# Patient Record
Sex: FEMALE | Race: WHITE
[De-identification: names, ages, dates, MRNs, and addresses within clinical notes are randomized per-mention and may not be internally consistent; named-entity substitution may affect disease eponyms.]

---

## 2018-07-09 ENCOUNTER — HOSPITAL ENCOUNTER (OUTPATIENT)
Dept: HOSPITAL 92 - LABBT | Age: 68
Discharge: HOME | End: 2018-07-09
Attending: SURGERY
Payer: MEDICARE

## 2018-07-09 DIAGNOSIS — Z01.818: Primary | ICD-10-CM

## 2018-07-09 DIAGNOSIS — C49.3: ICD-10-CM

## 2018-07-09 LAB
ANION GAP SERPL CALC-SCNC: 18 MMOL/L (ref 10–20)
BUN SERPL-MCNC: 13 MG/DL (ref 9.8–20.1)
CALCIUM SERPL-MCNC: 9.5 MG/DL (ref 7.8–10.44)
CHLORIDE SERPL-SCNC: 105 MMOL/L (ref 98–107)
CO2 SERPL-SCNC: 18 MMOL/L (ref 23–31)
CREAT CL PREDICTED SERPL C-G-VRATE: 0 ML/MIN (ref 70–130)
GLUCOSE SERPL-MCNC: 80 MG/DL (ref 80–115)
HGB BLD-MCNC: 12.6 G/DL (ref 12–16)
MCH RBC QN AUTO: 29.4 PG (ref 27–31)
MCV RBC AUTO: 88.8 FL (ref 78–98)
MDIFF COMPLETE?: YES
PLATELET # BLD AUTO: 262 THOU/UL (ref 130–400)
PLATELET BLD QL SMEAR: (no result)
POTASSIUM SERPL-SCNC: 4.3 MMOL/L (ref 3.5–5.1)
RBC # BLD AUTO: 4.28 MILL/UL (ref 4.2–5.4)
SODIUM SERPL-SCNC: 136 MMOL/L (ref 136–145)
WBC # BLD AUTO: 4.9 THOU/UL (ref 4.8–10.8)

## 2018-07-09 PROCEDURE — 93005 ELECTROCARDIOGRAM TRACING: CPT

## 2018-07-09 PROCEDURE — 85025 COMPLETE CBC W/AUTO DIFF WBC: CPT

## 2018-07-09 PROCEDURE — 93010 ELECTROCARDIOGRAM REPORT: CPT

## 2018-07-09 PROCEDURE — 80048 BASIC METABOLIC PNL TOTAL CA: CPT

## 2018-07-10 NOTE — EKG
Test Reason : 

Blood Pressure : ***/*** mmHG

Vent. Rate : 064 BPM     Atrial Rate : 064 BPM

   P-R Int : 188 ms          QRS Dur : 074 ms

    QT Int : 408 ms       P-R-T Axes : 060 071 071 degrees

   QTc Int : 420 ms

 

Normal sinus rhythm

ST elevation, consider early repolarization

Borderline ECG

No previous ECGs available

Confirmed by PAULETTE CARRILLO ([id number]) on 7/10/2018 12:47:13 PM

 

Referred By:  ANDRE           Confirmed By:PAULETTE CARRILLO

## 2018-07-12 ENCOUNTER — HOSPITAL ENCOUNTER (OUTPATIENT)
Dept: HOSPITAL 92 - SDC | Age: 68
Discharge: HOME | End: 2018-07-12
Attending: SURGERY
Payer: MEDICARE

## 2018-07-12 VITALS — BODY MASS INDEX: 24 KG/M2

## 2018-07-12 DIAGNOSIS — Z79.899: ICD-10-CM

## 2018-07-12 DIAGNOSIS — Z79.82: ICD-10-CM

## 2018-07-12 DIAGNOSIS — E78.2: ICD-10-CM

## 2018-07-12 DIAGNOSIS — E78.00: ICD-10-CM

## 2018-07-12 DIAGNOSIS — Z17.0: ICD-10-CM

## 2018-07-12 DIAGNOSIS — M79.7: ICD-10-CM

## 2018-07-12 DIAGNOSIS — M81.0: ICD-10-CM

## 2018-07-12 DIAGNOSIS — Z88.2: ICD-10-CM

## 2018-07-12 DIAGNOSIS — C50.812: Primary | ICD-10-CM

## 2018-07-12 DIAGNOSIS — M19.90: ICD-10-CM

## 2018-07-12 PROCEDURE — A9541 TC99M SULFUR COLLOID: HCPCS

## 2018-07-12 PROCEDURE — 0HBU0ZZ EXCISION OF LEFT BREAST, OPEN APPROACH: ICD-10-PCS | Performed by: SURGERY

## 2018-07-12 PROCEDURE — 88342 IMHCHEM/IMCYTCHM 1ST ANTB: CPT

## 2018-07-12 PROCEDURE — 19301 PARTIAL MASTECTOMY: CPT

## 2018-07-12 PROCEDURE — 88341 IMHCHEM/IMCYTCHM EA ADD ANTB: CPT

## 2018-07-12 PROCEDURE — 78195 LYMPH SYSTEM IMAGING: CPT

## 2018-07-12 PROCEDURE — 88307 TISSUE EXAM BY PATHOLOGIST: CPT

## 2018-07-12 PROCEDURE — S0028 INJECTION, FAMOTIDINE, 20 MG: HCPCS

## 2018-07-12 NOTE — NM
LEFT BREAST LYMPHOSCINTIGRAPHY:

 

HISTORY:

Breast cancer.  Status post right mastectomy in 1996 and left mastectomy in 1997.  Tumor recurrence o
n the left.

 

RADIOPHARMACEUTICAL:

Technetium 99 sulfur colloid 439 microcuries injected in the left anterior chest, surrounding the rec
ently biopsied lesion.

 

FINDINGS:

No evidence of tracer movement or visualization of activity in the axillary, cervical, or intrathorac
ic lymph nodes is seen.

 

IMPRESSION:

Nonvisualization of sentinel lymph nodes.

 

POS: [facility]

## 2018-07-18 NOTE — PDOC.OP
Operative Note





- Operative Note


Operative Note: 





PROCEDURE:  Wide local excision of breast cancer left chest wall.





DATE OF PROCEDURE: 7/12/2018





SURGEON: Mame Reese M.D.





PREOPERATIVE DIAGNOSES: Breast cancer left chest wall





POSTOPERATIVE DIAGNOSIS: Breast cancer left chest wall





HISTORY: Patient with a history of right breast cancer status post modified 

radical mastectomy several decades ago. She underwent a left mastectomy for 

benign disease not long after that. Recently she noted a skin lesion on her 

left chest wall at the mastectomy incision and a biopsy came back consistent 

with adenocarcinoma of breast origin. It is not clear whether this is a second 

primary lesion arising from residual breast tissue or a metastatic lesion. 

Recommendation was made to proceed with attempted sentinel lymph node biopsy 

and wide local excision of the left chest wall lesion which on CT appeared to 

be extending down to the pectoralis muscle. Preoperative lymphoscintigraphy 

showed no uptake in axillary or chest lymph nodes, with the radioactive isotope 

remaining solely in the chest wall at the injection site, so sentinel lymph 

node biopsy was not done. Axillary dissection had been discussed with the 

patient that not recommended due to a clinically negative axilla.





PROCEDURE IN DETAIL: After informed consent was obtained the patient was taken 

to the operating room she was placed in supine position and anesthesia was 

administered. She was prepped and draped in standard sterile fashion and a 

transverse elliptical incision marked with 1 cm margins superior and inferior 

to the edges of the skin lesion. The patient was noted to have some slightly 

nodular tissue medial to the skin lesion so the inferior margin was slightly 

more generous in that area. Dissection was carried down to the chest wall 

circumferentially and the specimen excised with the underlying pectoralis 

muscle using electrocautery. The specimen was oriented with a long lateral and 

short superior suture and passed from the field. The wound was irrigated and 

hemostasis verified. The skin and subcutaneous tissues were elevated off of the 

underlying chest wall using electrocautery to allow tension-free primary 

closure. This required about 5 cm of undermining superiorly and inferiorly. The 

wound was then closed with interrupted figure-of-eight deep dermal and 

subcutaneous tissues and a running vertical mattress skin suture. A sterile 

dressing was applied and the patient was taken to recovery in good condition. 

Estimated blood loss was minimal. There are no complications. Specimen is left 

chest wall mass.

## 2018-10-22 ENCOUNTER — HOSPITAL ENCOUNTER (OUTPATIENT)
Dept: HOSPITAL 92 - BICMAMMO | Age: 68
Discharge: HOME | End: 2018-10-22
Attending: INTERNAL MEDICINE
Payer: MEDICARE

## 2018-10-22 DIAGNOSIS — C50.112: Primary | ICD-10-CM

## 2018-10-22 DIAGNOSIS — N95.9: ICD-10-CM

## 2018-10-22 DIAGNOSIS — M85.89: ICD-10-CM

## 2018-10-22 PROCEDURE — 77080 DXA BONE DENSITY AXIAL: CPT

## 2018-10-22 NOTE — BD
DEXA BONE DENSITY STUDY:

 

HISTORY: 

A 67-year-old female for screening, malignant neoplasm of the central portion of the left female mohan
st.

 

Lumbar Spine:       BMD (g/cm2)

    L1                 0.845             T-Score: -1.3

    L2                 0.831             T-Score: -1.8

    L3                 0.824             T-Score: -2.4

    L4                 0.866             T-Score: -1.8

 

    L1-L4              0.843             T-Score: -1.9

 

Evidence for osteopenia with increased risk for fracture, bone mineral density has increased 2% from 
prior 1/9/2017.

 

Femoral Neck:          0.612             T-Score: -2.1

 

Total Femur:           0.790             T-Score: -1.2

 

Evidence for osteopenia with increased risk for fracture, bone mineral density has decreased 0.1% fro
m 1/9/2017.

 

FRAX score:

Major osteoporotic fracture 11%.  Hip fracture 1.9%.

 

POS: [facility]

## 2022-10-28 ENCOUNTER — HOSPITAL ENCOUNTER (OUTPATIENT)
Dept: HOSPITAL 92 - CSHMAMMO | Age: 72
Discharge: HOME | End: 2022-10-28
Attending: INTERNAL MEDICINE
Payer: MEDICARE

## 2022-10-28 DIAGNOSIS — M85.89: ICD-10-CM

## 2022-10-28 DIAGNOSIS — M81.8: Primary | ICD-10-CM

## 2022-10-28 PROCEDURE — 77080 DXA BONE DENSITY AXIAL: CPT

## 2023-07-27 ENCOUNTER — HOSPITAL ENCOUNTER (OUTPATIENT)
Dept: HOSPITAL 92 - CSHSDC | Age: 73
Discharge: HOME | End: 2023-07-27
Attending: INTERNAL MEDICINE
Payer: MEDICARE

## 2023-07-27 VITALS — BODY MASS INDEX: 23.2 KG/M2

## 2023-07-27 DIAGNOSIS — Z91.018: ICD-10-CM

## 2023-07-27 DIAGNOSIS — D12.8: Primary | ICD-10-CM

## 2023-07-27 DIAGNOSIS — Z86.010: ICD-10-CM

## 2023-07-27 DIAGNOSIS — Z90.13: ICD-10-CM

## 2023-07-27 DIAGNOSIS — M54.50: ICD-10-CM

## 2023-07-27 DIAGNOSIS — Z88.2: ICD-10-CM

## 2023-07-27 DIAGNOSIS — E78.5: ICD-10-CM

## 2023-07-27 DIAGNOSIS — M19.90: ICD-10-CM

## 2023-07-27 DIAGNOSIS — M81.0: ICD-10-CM

## 2023-07-27 DIAGNOSIS — K64.9: ICD-10-CM

## 2023-07-27 DIAGNOSIS — Z79.899: ICD-10-CM

## 2023-07-27 DIAGNOSIS — Q82.0: ICD-10-CM

## 2023-07-27 PROCEDURE — 88305 TISSUE EXAM BY PATHOLOGIST: CPT

## 2023-07-27 PROCEDURE — 0DBP8ZZ EXCISION OF RECTUM, VIA NATURAL OR ARTIFICIAL OPENING ENDOSCOPIC: ICD-10-PCS | Performed by: INTERNAL MEDICINE

## 2023-10-30 ENCOUNTER — HOSPITAL ENCOUNTER (OUTPATIENT)
Dept: HOSPITAL 92 - CSHMAMMO | Age: 73
Discharge: HOME | End: 2023-10-30
Attending: INTERNAL MEDICINE
Payer: MEDICARE

## 2023-10-30 DIAGNOSIS — M85.89: ICD-10-CM

## 2023-10-30 DIAGNOSIS — M81.8: Primary | ICD-10-CM

## 2023-10-30 DIAGNOSIS — C50.112: ICD-10-CM

## 2023-10-30 PROCEDURE — 77080 DXA BONE DENSITY AXIAL: CPT
